# Patient Record
Sex: FEMALE | Race: OTHER | NOT HISPANIC OR LATINO | URBAN - METROPOLITAN AREA
[De-identification: names, ages, dates, MRNs, and addresses within clinical notes are randomized per-mention and may not be internally consistent; named-entity substitution may affect disease eponyms.]

---

## 2022-03-17 ENCOUNTER — EMERGENCY (EMERGENCY)
Facility: HOSPITAL | Age: 77
LOS: 1 days | Discharge: HOME | End: 2022-03-17
Attending: EMERGENCY MEDICINE | Admitting: EMERGENCY MEDICINE
Payer: MEDICARE

## 2022-03-17 VITALS
DIASTOLIC BLOOD PRESSURE: 78 MMHG | SYSTOLIC BLOOD PRESSURE: 168 MMHG | WEIGHT: 218.04 LBS | RESPIRATION RATE: 18 BRPM | HEART RATE: 79 BPM | OXYGEN SATURATION: 96 % | HEIGHT: 60 IN | TEMPERATURE: 98 F

## 2022-03-17 DIAGNOSIS — Z88.7 ALLERGY STATUS TO SERUM AND VACCINE: ICD-10-CM
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
## 2022-03-17 DIAGNOSIS — M25.512 PAIN IN LEFT SHOULDER: ICD-10-CM

## 2022-03-17 DIAGNOSIS — S49.92XA UNSPECIFIED INJURY OF LEFT SHOULDER AND UPPER ARM, INITIAL ENCOUNTER: ICD-10-CM
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
## 2022-03-17 DIAGNOSIS — J45.909 UNSPECIFIED ASTHMA, UNCOMPLICATED: ICD-10-CM
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
## 2022-03-17 PROCEDURE — 93010 ELECTROCARDIOGRAM REPORT: CPT

## 2022-03-17 PROCEDURE — 99284 EMERGENCY DEPT VISIT MOD MDM: CPT | Mod: 25

## 2022-03-17 PROCEDURE — 73562 X-RAY EXAM OF KNEE 3: CPT | Mod: 26,LT

## 2022-03-17 PROCEDURE — 73060 X-RAY EXAM OF HUMERUS: CPT | Mod: 26,LT

## 2022-03-17 PROCEDURE — 73030 X-RAY EXAM OF SHOULDER: CPT | Mod: 26,LT

## 2022-03-17 RX ORDER — IBUPROFEN 200 MG
400 TABLET ORAL ONCE
Refills: 0 | Status: COMPLETED | OUTPATIENT
Start: 2022-03-17 | End: 2022-03-17

## 2022-03-17 RX ADMIN — Medication 400 MILLIGRAM(S): at 17:54

## 2022-03-17 NOTE — ED PROVIDER NOTE - CLINICAL SUMMARY MEDICAL DECISION MAKING FREE TEXT BOX
1. Mechanical fall - a. shoulder/humeral pain - concern for humeral neck fracture - will Xray.   b. knee pain - exam benign - FROM, no point TTP and able to bear full weight, suspicion for fracture is low but will xray to be sure no fracture.    c. - pain control - Motrin, pt declines anything stronger.     2. While pt was drinking water for Motrin she notes episodic pain in her chest as the water went down to her stomach.  Completely resolved after a few seconds.  EKG performed and WNL.  Unlikely cardiac.

## 2022-03-17 NOTE — ED PROVIDER NOTE - NSFOLLOWUPINSTRUCTIONS_ED_ALL_ED_FT
PLEASE FOLLOW-UP WITH ONE OF OUT SHOULDER SPECIALIST, HERE ON THE SIXTH FLOOR.  SEE ABOVE FOR REFERRALS.      -TAKE OVER THE COUNTER TYLENOL 650MG BY MOUTH EVERY 4-6 HOURS AS NEEDED FOR PAIN.  DO NOT MIX WITH ALCOHOL OR OTHER PRESCRIPTION MEDICATIONS THAT ALREADY CONTAIN TYLENOL OR ACETAMINOPHEN.     -PLEASE RETURN TO THE ER IMMEDIATELY OR CALL 911 FOR ANY HIGH FEVER, TROUBLE BREATHING, VOMITING, SEVERE PAIN, OR ANY OTHER CONCERNS.    How to use a Sling       A sling is a type of hanging bandage that is worn around the neck to protect an injured arm, shoulder, or other body part. A sling may be needed to prevent movement of (to immobilize) the injured body part while it heals. Keeping the injured body part still can lessen pain and speed up healing. A health care provider may recommend using a sling:  •To treat a broken arm or collarbone.      •To treat a shoulder injury.      •After surgery.        What are the risks?    In general, wearing a sling helps with safe healing. However, in some cases, wearing a sling the wrong way can:  •Make the injury worse.      •Cause stiffness or numbness.      •Affect blood flow (circulation) in the arm and hand. This can cause tingling or numbness in the fingers or hands.        How to use a sling    Follow instructions from your health care provider about how and when to wear your sling. Your health care provider will show you or tell you:  •How to put on the sling.      •How to adjust the sling.      •When and how often to wear the sling.      •How to remove the sling.      The way that you need to use a sling depends on your injury. Unless your health care provider gives you different instructions, you should:  •Wear the sling so that your elbow bends to the shape of a capital letter "L" (90 degrees, or a right angle).      •Make sure the sling supports your wrist and your hand.      •Adjust the sling if your fingers or hand start to tingle or feel numb.        Follow these instructions at home:    •Try to avoid moving your arm.      • Do not twist, lift, or move your arm in a way that could make your injury worse.      • Do not lean on your arm while wearing a sling.      • Do not lift anything with the hand or arm that is in the sling.        Contact a health care provider if:    •You have bruising, swelling, or pain that gets worse.      •You have pain that does not get better with medicine.      •You have a fever.      •Your sling is not supporting your arm properly.      •Your sling gets damaged.        Get help right away if you:    •Have numbness or tingling in your fingers.      •Notice that your fingers turn blue or feel cold to the touch.      •Cannot control bleeding from your injury.      •Have shortness of breath.        Summary    •A sling is a type of hanging bandage that is worn around the neck to protect an injured arm, shoulder, or other body part. A sling may be needed to prevent movement of (to immobilize) the injured body part while it heals.      •The way that you use a sling depends on your injury. Carefully follow instructions from your health care provider.      •A good general rule is to wear the sling so that your arms bends 90 degrees (at a right angle) at the elbow. That is like the shape of a capital letter "L."      •Make sure you know which problems should cause you to contact your health care provider or get help right away.      This information is not intended to replace advice given to you by your health care provider. Make sure you discuss any questions you have with your health care provider.

## 2022-03-17 NOTE — ED PROVIDER NOTE - WR INTERPRETATION 3
Xray L shoulder - no dislocation, +possible hairline fracture to humeral neck, only seen on single view.  Told about posibility of fracture and placed in sling and given ortho f/u.  Soft tissues wnl.

## 2022-03-17 NOTE — ED PROVIDER NOTE - PHYSICAL EXAMINATION
VITAL SIGNS: I have reviewed nursing notes and confirm.  CONSTITUTIONAL: Well-developed; well-nourished; in no acute distress.  SKIN: Skin is warm and dry, no acute rash.  HEAD: Normocephalic; atraumatic.  EYES: PERRL, EOM intact; conjunctiva and sclera clear.  ENT: No nasal discharge; airway clear.  NECK: Supple; non tender.  CARD: S1, S2 normal; no murmurs, gallops, or rubs. Regular rate and rhythm.  RESP: No wheezes, rales or rhonchi.  ABD: Normal bowel sounds; soft; non-distended; non-tender; no hepatosplenomegaly.  MSK: LUE held in an adducted position.  NVID.  Unable to abduct arm 2/2 pain.    PSYCH: Cooperative, appropriate.

## 2022-03-17 NOTE — ED PROVIDER NOTE - NSPTACCESSSVCSAPPTDETAILS_ED_ALL_ED_FT
Needs close follow-up with a shoulder specialist for fall with possible hairline fracture of Left humeral neck.

## 2022-03-17 NOTE — ED ADULT NURSE NOTE - OBJECTIVE STATEMENT
Pt presents to ED sp slip and fall. Pt fell onto left side complains of L arm and L knee pain primarily. Pt demonstrates ability to ambulate. Denies headstrike or blood thinner usage. Limited ROM to L arm.

## 2022-03-17 NOTE — ED ADULT TRIAGE NOTE - ESI TRIAGE ACUITY LEVEL, MLM
Patient requesting Rx refill on Zofran 4 mg ODT. Verbalized to patient was last seen in October of 2019, not sure if provider will refill but will send request. Patient verbalized understanding. RX pended. Please advise.    3

## 2022-03-17 NOTE — ED ADULT TRIAGE NOTE - CHIEF COMPLAINT QUOTE
c/o L sided body pain s/p slip and fall earlier today. was told by PMD to come into the ED for possible radiology. denies head injury, LOC or anticoagulants. h/o HTN. took 1g tylenol at 330pm

## 2022-03-17 NOTE — ED PROVIDER NOTE - WR INTERPRETATION 2
Xray L humerus - no dislocation, +possible hairline fracture to humeral neck, only seen on single view of L shoulder xray.  Told about posibility of fracture and placed in sling and given ortho f/u.  Soft tissues wnl.

## 2022-03-17 NOTE — ED PROVIDER NOTE - PATIENT PORTAL LINK FT
You can access the FollowMyHealth Patient Portal offered by [facility] by registering at the following website: [email or web address]. By joining [facility]’s FollowMyHealth portal, you will also be able to view your health information using other applications (apps) compatible with our system.

## 2022-03-17 NOTE — ED PROVIDER NOTE - OBJECTIVE STATEMENT
Pt is a 78yo F who sustained a mechanical trip and fall today while entering a store.  Pt reports falling onto her L side.  She denies head injury.  Denies neck pain/injury.  Pt fell onto LUE with it abducted and onto her L knee.  Now with pain to L upper humerus/shoulder and her L knee - over lateral aspect.  Able to walk normally.  Pt reports fall occurred between 12-1230pm and she reports taking Tylenol at 330pm.  Tylenol helped but L shoulder pain persisted and she started having difficulty with ROM of L shoulder so she called her PCP who instructed her to come in for Xrays.  Pain is sharp and exacerbated when trying to abduct her LUE.